# Patient Record
Sex: MALE | Race: WHITE | NOT HISPANIC OR LATINO
[De-identification: names, ages, dates, MRNs, and addresses within clinical notes are randomized per-mention and may not be internally consistent; named-entity substitution may affect disease eponyms.]

---

## 2017-02-07 ENCOUNTER — RX RENEWAL (OUTPATIENT)
Age: 42
End: 2017-02-07

## 2017-04-27 ENCOUNTER — APPOINTMENT (OUTPATIENT)
Dept: PULMONOLOGY | Facility: CLINIC | Age: 42
End: 2017-04-27

## 2017-04-27 VITALS
DIASTOLIC BLOOD PRESSURE: 84 MMHG | HEIGHT: 71 IN | OXYGEN SATURATION: 98 % | TEMPERATURE: 97.2 F | BODY MASS INDEX: 25.06 KG/M2 | SYSTOLIC BLOOD PRESSURE: 120 MMHG | WEIGHT: 179 LBS | HEART RATE: 72 BPM

## 2017-04-27 DIAGNOSIS — Z82.49 FAMILY HISTORY OF ISCHEMIC HEART DISEASE AND OTHER DISEASES OF THE CIRCULATORY SYSTEM: ICD-10-CM

## 2017-04-27 DIAGNOSIS — Z83.3 FAMILY HISTORY OF DIABETES MELLITUS: ICD-10-CM

## 2017-05-01 ENCOUNTER — APPOINTMENT (OUTPATIENT)
Dept: ENDOCRINOLOGY | Facility: CLINIC | Age: 42
End: 2017-05-01

## 2017-05-01 VITALS
HEART RATE: 68 BPM | SYSTOLIC BLOOD PRESSURE: 123 MMHG | WEIGHT: 174 LBS | DIASTOLIC BLOOD PRESSURE: 78 MMHG | BODY MASS INDEX: 24.27 KG/M2

## 2017-05-01 DIAGNOSIS — F41.9 ANXIETY DISORDER, UNSPECIFIED: ICD-10-CM

## 2017-05-03 LAB
THYROGLOB AB SERPL-ACNC: <20 IU/ML
THYROGLOB SERPL-MCNC: <0.2 NG/ML
TSH SERPL-ACNC: 1.01 UIU/ML

## 2017-05-25 ENCOUNTER — MEDICATION RENEWAL (OUTPATIENT)
Age: 42
End: 2017-05-25

## 2017-06-04 ENCOUNTER — LABORATORY RESULT (OUTPATIENT)
Age: 42
End: 2017-06-04

## 2017-06-05 ENCOUNTER — APPOINTMENT (OUTPATIENT)
Dept: HEART AND VASCULAR | Facility: CLINIC | Age: 42
End: 2017-06-05

## 2017-06-05 VITALS
SYSTOLIC BLOOD PRESSURE: 120 MMHG | WEIGHT: 175 LBS | BODY MASS INDEX: 24.5 KG/M2 | TEMPERATURE: 97.9 F | DIASTOLIC BLOOD PRESSURE: 60 MMHG | HEART RATE: 75 BPM | OXYGEN SATURATION: 98 % | HEIGHT: 71 IN

## 2017-06-06 LAB
ALBUMIN SERPL ELPH-MCNC: 5 G/DL
ALP BLD-CCNC: 60 U/L
ALT SERPL-CCNC: 70 U/L
ANION GAP SERPL CALC-SCNC: 21 MMOL/L
AST SERPL-CCNC: 21 U/L
BASOPHILS # BLD AUTO: 0 K/UL
BASOPHILS NFR BLD AUTO: 0 %
BILIRUB SERPL-MCNC: 1 MG/DL
BUN SERPL-MCNC: 22 MG/DL
CALCIUM SERPL-MCNC: 9.7 MG/DL
CHLORIDE SERPL-SCNC: 101 MMOL/L
CHOLEST SERPL-MCNC: 162 MG/DL
CHOLEST/HDLC SERPL: 5.6 RATIO
CO2 SERPL-SCNC: 20 MMOL/L
CREAT SERPL-MCNC: 1.06 MG/DL
EOSINOPHIL # BLD AUTO: 0.13 K/UL
EOSINOPHIL NFR BLD AUTO: 1.8 %
GLUCOSE SERPL-MCNC: 88 MG/DL
HBA1C MFR BLD HPLC: 4.8 %
HCT VFR BLD CALC: 43 %
HDLC SERPL-MCNC: 29 MG/DL
HGB BLD-MCNC: 14.7 G/DL
LDLC SERPL CALC-MCNC: 99 MG/DL
LYMPHOCYTES # BLD AUTO: 1.23 K/UL
LYMPHOCYTES NFR BLD AUTO: 17.5 %
MAN DIFF?: NORMAL
MCHC RBC-ENTMCNC: 30.5 PG
MCHC RBC-ENTMCNC: 34.2 GM/DL
MCV RBC AUTO: 89.2 FL
MONOCYTES # BLD AUTO: 0.31 K/UL
MONOCYTES NFR BLD AUTO: 4.4 %
NEUTROPHILS # BLD AUTO: 5.3 K/UL
NEUTROPHILS NFR BLD AUTO: 75.4 %
PLATELET # BLD AUTO: 178 K/UL
POTASSIUM SERPL-SCNC: 4.5 MMOL/L
PROT SERPL-MCNC: 7.6 G/DL
RBC # BLD: 4.82 M/UL
RBC # FLD: 13.8 %
SODIUM SERPL-SCNC: 142 MMOL/L
TRIGL SERPL-MCNC: 172 MG/DL
TSH SERPL-ACNC: 1.06 UIU/ML
WBC # FLD AUTO: 7.03 K/UL

## 2017-06-07 ENCOUNTER — CLINICAL ADVICE (OUTPATIENT)
Age: 42
End: 2017-06-07

## 2017-06-16 ENCOUNTER — MEDICATION RENEWAL (OUTPATIENT)
Age: 42
End: 2017-06-16

## 2017-07-10 ENCOUNTER — APPOINTMENT (OUTPATIENT)
Dept: HEART AND VASCULAR | Facility: CLINIC | Age: 42
End: 2017-07-10

## 2017-07-31 ENCOUNTER — APPOINTMENT (OUTPATIENT)
Dept: HEART AND VASCULAR | Facility: CLINIC | Age: 42
End: 2017-07-31
Payer: COMMERCIAL

## 2017-07-31 ENCOUNTER — APPOINTMENT (OUTPATIENT)
Dept: HEART AND VASCULAR | Facility: CLINIC | Age: 42
End: 2017-07-31

## 2017-07-31 VITALS
HEART RATE: 74 BPM | DIASTOLIC BLOOD PRESSURE: 70 MMHG | BODY MASS INDEX: 24.5 KG/M2 | SYSTOLIC BLOOD PRESSURE: 110 MMHG | HEIGHT: 71 IN | WEIGHT: 175 LBS

## 2017-07-31 DIAGNOSIS — R06.09 OTHER FORMS OF DYSPNEA: ICD-10-CM

## 2017-07-31 PROCEDURE — 99214 OFFICE O/P EST MOD 30 MIN: CPT | Mod: 25

## 2017-07-31 PROCEDURE — 93015 CV STRESS TEST SUPVJ I&R: CPT

## 2017-08-15 ENCOUNTER — TRANSCRIPTION ENCOUNTER (OUTPATIENT)
Age: 42
End: 2017-08-15

## 2017-08-16 ENCOUNTER — MEDICATION RENEWAL (OUTPATIENT)
Age: 42
End: 2017-08-16

## 2017-11-06 ENCOUNTER — CLINICAL ADVICE (OUTPATIENT)
Age: 42
End: 2017-11-06

## 2017-12-04 ENCOUNTER — APPOINTMENT (OUTPATIENT)
Dept: GASTROENTEROLOGY | Facility: CLINIC | Age: 42
End: 2017-12-04
Payer: COMMERCIAL

## 2017-12-04 VITALS
RESPIRATION RATE: 14 BRPM | SYSTOLIC BLOOD PRESSURE: 125 MMHG | TEMPERATURE: 98.1 F | WEIGHT: 168 LBS | DIASTOLIC BLOOD PRESSURE: 80 MMHG | BODY MASS INDEX: 23.43 KG/M2 | OXYGEN SATURATION: 99 % | HEART RATE: 78 BPM

## 2017-12-04 PROCEDURE — 36415 COLL VENOUS BLD VENIPUNCTURE: CPT

## 2017-12-04 PROCEDURE — 99203 OFFICE O/P NEW LOW 30 MIN: CPT | Mod: 25

## 2017-12-05 LAB
IRON SATN MFR SERPL: 27 %
IRON SERPL-MCNC: 74 UG/DL
TIBC SERPL-MCNC: 273 UG/DL
UIBC SERPL-MCNC: 199 UG/DL

## 2017-12-08 ENCOUNTER — RESULT REVIEW (OUTPATIENT)
Age: 42
End: 2017-12-08

## 2017-12-20 ENCOUNTER — FORM ENCOUNTER (OUTPATIENT)
Age: 42
End: 2017-12-20

## 2017-12-20 DIAGNOSIS — C73 MALIGNANT NEOPLASM OF THYROID GLAND: ICD-10-CM

## 2017-12-21 ENCOUNTER — OUTPATIENT (OUTPATIENT)
Dept: OUTPATIENT SERVICES | Facility: HOSPITAL | Age: 42
LOS: 1 days | End: 2017-12-21
Payer: COMMERCIAL

## 2017-12-21 PROCEDURE — 76705 ECHO EXAM OF ABDOMEN: CPT

## 2017-12-21 PROCEDURE — 76705 ECHO EXAM OF ABDOMEN: CPT | Mod: 26

## 2017-12-24 PROBLEM — C73 PAPILLARY THYROID CARCINOMA: Status: ACTIVE | Noted: 2017-12-24

## 2018-01-05 ENCOUNTER — RESULT REVIEW (OUTPATIENT)
Age: 43
End: 2018-01-05

## 2018-01-24 ENCOUNTER — APPOINTMENT (OUTPATIENT)
Dept: HEMATOLOGY ONCOLOGY | Facility: CLINIC | Age: 43
End: 2018-01-24
Payer: COMMERCIAL

## 2018-01-24 VITALS
WEIGHT: 173 LBS | HEIGHT: 71 IN | HEART RATE: 61 BPM | BODY MASS INDEX: 24.22 KG/M2 | TEMPERATURE: 97.7 F | DIASTOLIC BLOOD PRESSURE: 75 MMHG | RESPIRATION RATE: 14 BRPM | OXYGEN SATURATION: 100 % | SYSTOLIC BLOOD PRESSURE: 126 MMHG

## 2018-01-24 PROCEDURE — 99205 OFFICE O/P NEW HI 60 MIN: CPT | Mod: 25

## 2018-01-24 PROCEDURE — 36415 COLL VENOUS BLD VENIPUNCTURE: CPT

## 2018-01-25 ENCOUNTER — LABORATORY RESULT (OUTPATIENT)
Age: 43
End: 2018-01-25

## 2018-01-25 LAB
25(OH)D3 SERPL-MCNC: 18.5 NG/ML
BASOPHILS # BLD AUTO: 0.03 K/UL
BASOPHILS NFR BLD AUTO: 0.5 %
EOSINOPHIL # BLD AUTO: 0.05 K/UL
EOSINOPHIL NFR BLD AUTO: 0.8 %
HCT VFR BLD CALC: 42.5 %
HGB BLD-MCNC: 15.3 G/DL
IMM GRANULOCYTES NFR BLD AUTO: 1.6 %
LDH SERPL-CCNC: 190 U/L
LYMPHOCYTES # BLD AUTO: 0.96 K/UL
LYMPHOCYTES NFR BLD AUTO: 16.1 %
MAN DIFF?: NO
MCHC RBC-ENTMCNC: 31.1 PG
MCHC RBC-ENTMCNC: 36 G/DL
MCV RBC AUTO: 86.4 FL
MONOCYTES # BLD AUTO: 0.27 K/UL
MONOCYTES NFR BLD AUTO: 3.5 %
NEUTROPHILS # BLD AUTO: 5.6 K/UL
NEUTROPHILS NFR BLD AUTO: 79.1 %
PLATELET # BLD AUTO: 198 K/UL
RBC # BLD: 4.92 M/UL
RBC # FLD: 13.3 %
VIT B12 SERPL-MCNC: 391 PG/ML
WBC # FLD AUTO: 8.25 K/UL

## 2018-02-01 ENCOUNTER — TRANSCRIPTION ENCOUNTER (OUTPATIENT)
Age: 43
End: 2018-02-01

## 2018-02-05 ENCOUNTER — RESULT REVIEW (OUTPATIENT)
Age: 43
End: 2018-02-05

## 2018-02-05 LAB — GAU1 GENE MUT TESTED BLD/T: NEGATIVE

## 2018-02-14 ENCOUNTER — RESULT REVIEW (OUTPATIENT)
Age: 43
End: 2018-02-14

## 2018-02-14 ENCOUNTER — TRANSCRIPTION ENCOUNTER (OUTPATIENT)
Age: 43
End: 2018-02-14

## 2018-02-14 ENCOUNTER — RX RENEWAL (OUTPATIENT)
Age: 43
End: 2018-02-14

## 2018-02-14 RX ORDER — ERGOCALCIFEROL 1.25 MG/1
1.25 MG CAPSULE, LIQUID FILLED ORAL
Qty: 4 | Refills: 5 | Status: ACTIVE | COMMUNITY
Start: 2018-02-14 | End: 1900-01-01

## 2018-03-05 ENCOUNTER — APPOINTMENT (OUTPATIENT)
Dept: GASTROENTEROLOGY | Facility: CLINIC | Age: 43
End: 2018-03-05

## 2018-03-22 ENCOUNTER — APPOINTMENT (OUTPATIENT)
Dept: HEMATOLOGY ONCOLOGY | Facility: CLINIC | Age: 43
End: 2018-03-22
Payer: COMMERCIAL

## 2018-03-22 VITALS
TEMPERATURE: 97.4 F | HEART RATE: 78 BPM | BODY MASS INDEX: 23.94 KG/M2 | SYSTOLIC BLOOD PRESSURE: 129 MMHG | WEIGHT: 171 LBS | OXYGEN SATURATION: 100 % | DIASTOLIC BLOOD PRESSURE: 79 MMHG | RESPIRATION RATE: 14 BRPM | HEIGHT: 71 IN

## 2018-03-22 DIAGNOSIS — R79.89 OTHER SPECIFIED ABNORMAL FINDINGS OF BLOOD CHEMISTRY: ICD-10-CM

## 2018-03-22 DIAGNOSIS — E53.8 DEFICIENCY OF OTHER SPECIFIED B GROUP VITAMINS: ICD-10-CM

## 2018-03-22 PROCEDURE — 36415 COLL VENOUS BLD VENIPUNCTURE: CPT

## 2018-03-22 PROCEDURE — 96372 THER/PROPH/DIAG INJ SC/IM: CPT

## 2018-03-22 PROCEDURE — 99214 OFFICE O/P EST MOD 30 MIN: CPT | Mod: 25

## 2018-03-22 RX ORDER — CYANOCOBALAMIN 1000 UG/ML
1000 INJECTION INTRAMUSCULAR; SUBCUTANEOUS
Qty: 0 | Refills: 0 | Status: COMPLETED | OUTPATIENT
Start: 2018-03-22

## 2018-03-22 RX ADMIN — CYANOCOBALAMIN 0 MCG/ML: 1000 INJECTION INTRAMUSCULAR; SUBCUTANEOUS at 00:00

## 2018-03-23 LAB
25(OH)D3 SERPL-MCNC: 30.6 NG/ML
ALBUMIN SERPL ELPH-MCNC: 4.9 G/DL
ALP BLD-CCNC: 52 U/L
ALT SERPL-CCNC: 31 U/L
ANION GAP SERPL CALC-SCNC: 14 MMOL/L
AST SERPL-CCNC: 18 U/L
BASOPHILS # BLD AUTO: 0.03 K/UL
BASOPHILS NFR BLD AUTO: 0.4 %
BILIRUB SERPL-MCNC: 0.8 MG/DL
BUN SERPL-MCNC: 18 MG/DL
CALCIUM SERPL-MCNC: 9.6 MG/DL
CHLORIDE SERPL-SCNC: 102 MMOL/L
CHOLEST SERPL-MCNC: 150 MG/DL
CHOLEST/HDLC SERPL: 4.8 RATIO
CO2 SERPL-SCNC: 23 MMOL/L
CREAT SERPL-MCNC: 0.96 MG/DL
EOSINOPHIL # BLD AUTO: 0.08 K/UL
EOSINOPHIL NFR BLD AUTO: 1.1 %
ERYTHROCYTE [SEDIMENTATION RATE] IN BLOOD BY WESTERGREN METHOD: 2 MM/HR
GLUCOSE SERPL-MCNC: 104 MG/DL
HCT VFR BLD CALC: 43.5 %
HDLC SERPL-MCNC: 31 MG/DL
HGB BLD-MCNC: 15 G/DL
IMM GRANULOCYTES NFR BLD AUTO: 0.7 %
LDH SERPL-CCNC: 155 U/L
LDLC SERPL CALC-MCNC: 80 MG/DL
LYMPHOCYTES # BLD AUTO: 1.23 K/UL
LYMPHOCYTES NFR BLD AUTO: 16.4 %
MAN DIFF?: NORMAL
MCHC RBC-ENTMCNC: 30.8 PG
MCHC RBC-ENTMCNC: 34.5 GM/DL
MCV RBC AUTO: 89.3 FL
MONOCYTES # BLD AUTO: 0.32 K/UL
MONOCYTES NFR BLD AUTO: 4.3 %
NEUTROPHILS # BLD AUTO: 5.81 K/UL
NEUTROPHILS NFR BLD AUTO: 77.1 %
PLATELET # BLD AUTO: 190 K/UL
POTASSIUM SERPL-SCNC: 4 MMOL/L
PROT SERPL-MCNC: 7.7 G/DL
RBC # BLD: 4.87 M/UL
RBC # FLD: 13.2 %
SODIUM SERPL-SCNC: 139 MMOL/L
TRIGL SERPL-MCNC: 195 MG/DL
TSH SERPL-ACNC: 3.08 UIU/ML
VIT B12 SERPL-MCNC: 1057 PG/ML
WBC # FLD AUTO: 7.52 K/UL

## 2018-04-23 ENCOUNTER — APPOINTMENT (OUTPATIENT)
Dept: HEMATOLOGY ONCOLOGY | Facility: CLINIC | Age: 43
End: 2018-04-23
Payer: COMMERCIAL

## 2018-04-23 VITALS
WEIGHT: 169 LBS | HEART RATE: 80 BPM | RESPIRATION RATE: 14 BRPM | BODY MASS INDEX: 23.66 KG/M2 | DIASTOLIC BLOOD PRESSURE: 81 MMHG | SYSTOLIC BLOOD PRESSURE: 131 MMHG | OXYGEN SATURATION: 99 % | TEMPERATURE: 98.1 F | HEIGHT: 71 IN

## 2018-04-23 DIAGNOSIS — R16.1 SPLENOMEGALY, NOT ELSEWHERE CLASSIFIED: ICD-10-CM

## 2018-04-23 PROCEDURE — 99214 OFFICE O/P EST MOD 30 MIN: CPT

## 2018-07-16 ENCOUNTER — RX RENEWAL (OUTPATIENT)
Age: 43
End: 2018-07-16

## 2018-07-18 ENCOUNTER — MEDICATION RENEWAL (OUTPATIENT)
Age: 43
End: 2018-07-18

## 2019-01-08 ENCOUNTER — RX RENEWAL (OUTPATIENT)
Age: 44
End: 2019-01-08

## 2019-02-27 ENCOUNTER — APPOINTMENT (OUTPATIENT)
Dept: ENDOCRINOLOGY | Facility: CLINIC | Age: 44
End: 2019-02-27
Payer: MEDICAID

## 2019-02-27 VITALS
SYSTOLIC BLOOD PRESSURE: 138 MMHG | DIASTOLIC BLOOD PRESSURE: 72 MMHG | HEART RATE: 71 BPM | BODY MASS INDEX: 24.13 KG/M2 | WEIGHT: 173 LBS

## 2019-02-27 DIAGNOSIS — E55.9 VITAMIN D DEFICIENCY, UNSPECIFIED: ICD-10-CM

## 2019-02-27 PROCEDURE — 99213 OFFICE O/P EST LOW 20 MIN: CPT | Mod: 25

## 2019-02-27 PROCEDURE — 36415 COLL VENOUS BLD VENIPUNCTURE: CPT

## 2019-02-27 NOTE — HISTORY OF PRESENT ILLNESS
[Date: ______] : [unfilled] [FULLER] : patient was treated with radioactive iodine [FreeTextEntry1] : saw cardiology for eval of getting lightheaded after walking up stairs or going from sitting to getting up\par he does not get lightheaded when playing tennis\par did stress test, was ok. \par found to have elevated ALT (70) but further workup was negative\par feeling ok now.  exercising less than before, not playing tennis recently\par weight stable\par sleeping well, still has some anxiety\par no constipation\par no palpitations\par was found to have low vitamin D, took 50K weekly for 2-3 months and then stopped\par \par meds:\par Unithroid 200mcg, 6.5 tab per week\par Ativan prn, takes 1-2x/month [de-identified] : total thyroidectomy [de-identified] : papillary, 4cm, left side [de-identified] : 4/2002 [de-identified] : 29 mCi [de-identified] : 5/17: Tg < 0.20, Tg Ab < 20\par 6/15: TSH 12, Tg < 0.20, Tg Ab < 20\par 9/14: TSH 9.7, Tg < 0.20, Tg Ab < 20

## 2019-02-27 NOTE — PHYSICAL EXAM
[Alert] : alert [Healthy Appearance] : healthy appearance [Normal Voice/Communication] : normal voice communication [No Proptosis] : no proptosis [No Lid Lag] : no lid lag [Normal Hearing] : hearing was normal [No LAD] : no lymphadenopathy [Clear to Auscultation] : lungs were clear to auscultation bilaterally [Normal S1, S2] : normal S1 and S2 [Regular Rhythm] : with a regular rhythm [No Stigmata of Cushings Syndrome] : no stigmata of cushings syndrome [de-identified] : thyroid not palpable

## 2019-02-27 NOTE — ASSESSMENT
[FreeTextEntry1] : Hypothyroid, surgical.  h/o thyroid cancer, but likely cured (no evidence of disease).  does not need TSH suppression, goal TSH 0.5-4.0 range, will adjust Unithroid dose, if necessary.\par will send for neck sono periodically (every 5 years) unless Tg is increasing.\par RTO 1 year

## 2019-02-27 NOTE — DATA REVIEWED
[FreeTextEntry1] : 3/18: TSH 3.08, tot chol 150, trgi 195, HDL 31, LDL 80, B12 1057, 25D 30.6\par 1/18: 25D 18.5\par 6/17: TSH 1.06, tot chol 162, trig 172, HDL 29, LDL 99\par 4/16: TSH 0.33, free T4 2.2

## 2019-03-01 LAB
25(OH)D3 SERPL-MCNC: 17.9 NG/ML
THYROGLOB AB SERPL-ACNC: <20 IU/ML
THYROGLOB SERPL-MCNC: <0.2 NG/ML
TSH SERPL-ACNC: 22.8 UIU/ML

## 2019-03-05 ENCOUNTER — TRANSCRIPTION ENCOUNTER (OUTPATIENT)
Age: 44
End: 2019-03-05

## 2019-03-21 ENCOUNTER — APPOINTMENT (OUTPATIENT)
Dept: HEART AND VASCULAR | Facility: CLINIC | Age: 44
End: 2019-03-21

## 2019-03-22 ENCOUNTER — APPOINTMENT (OUTPATIENT)
Dept: HEART AND VASCULAR | Facility: CLINIC | Age: 44
End: 2019-03-22
Payer: MEDICAID

## 2019-03-22 VITALS
HEART RATE: 65 BPM | HEIGHT: 71 IN | BODY MASS INDEX: 24.22 KG/M2 | RESPIRATION RATE: 14 BRPM | WEIGHT: 173 LBS | SYSTOLIC BLOOD PRESSURE: 118 MMHG | OXYGEN SATURATION: 99 % | DIASTOLIC BLOOD PRESSURE: 75 MMHG

## 2019-03-22 DIAGNOSIS — R00.2 PALPITATIONS: ICD-10-CM

## 2019-03-22 PROCEDURE — 36415 COLL VENOUS BLD VENIPUNCTURE: CPT

## 2019-03-22 PROCEDURE — 93000 ELECTROCARDIOGRAM COMPLETE: CPT

## 2019-03-22 PROCEDURE — 99396 PREV VISIT EST AGE 40-64: CPT

## 2019-03-22 RX ORDER — LORAZEPAM 1 MG/1
1 TABLET ORAL
Refills: 0 | Status: DISCONTINUED | COMMUNITY
End: 2019-03-22

## 2019-03-22 NOTE — DISCUSSION/SUMMARY
[FreeTextEntry1] : discussed rfm and vaccines and cancer screening\par referred ortho/hand\par event recorder\par seeing endo in f/u re tsh

## 2019-03-22 NOTE — HISTORY OF PRESENT ILLNESS
[FreeTextEntry1] : here for annualfollows w endo and gi and heme\par still gets pallps, or racing about once a month\par associated with lightheadedness but no syncope\par also associated with dyspnea\par no chest tightness\par also comp of right pinkie finger pain that radiates up the arm and locking of the joint

## 2019-03-22 NOTE — PHYSICAL EXAM
[General Appearance - Well Developed] : well developed [Normal Appearance] : normal appearance [Well Groomed] : well groomed [General Appearance - Well Nourished] : well nourished [No Deformities] : no deformities [General Appearance - In No Acute Distress] : no acute distress [Normal Conjunctiva] : the conjunctiva exhibited no abnormalities [Eyelids - No Xanthelasma] : the eyelids demonstrated no xanthelasmas [Normal Oral Mucosa] : normal oral mucosa [No Oral Pallor] : no oral pallor [No Oral Cyanosis] : no oral cyanosis [Normal Jugular Venous A Waves Present] : normal jugular venous A waves present [Normal Jugular Venous V Waves Present] : normal jugular venous V waves present [No Jugular Venous [place] A Waves] : no jugular venous new[place] A waves [Respiration, Rhythm And Depth] : normal respiratory rhythm and effort [Exaggerated Use Of Accessory Muscles For Inspiration] : no accessory muscle use [Auscultation Breath Sounds / Voice Sounds] : lungs were clear to auscultation bilaterally [Heart Rate And Rhythm] : heart rate and rhythm were normal [Heart Sounds] : normal S1 and S2 [Murmurs] : no murmurs present [Abdomen Soft] : soft [Abdomen Tenderness] : non-tender [Abdomen Mass (___ Cm)] : no abdominal mass palpated [Abnormal Walk] : normal gait [Gait - Sufficient For Exercise Testing] : the gait was sufficient for exercise testing [Nail Clubbing] : no clubbing of the fingernails [Cyanosis, Localized] : no localized cyanosis [Petechial Hemorrhages (___cm)] : no petechial hemorrhages [Skin Color & Pigmentation] : normal skin color and pigmentation [] : no rash [No Venous Stasis] : no venous stasis [Skin Lesions] : no skin lesions [No Skin Ulcers] : no skin ulcer [No Xanthoma] : no  xanthoma was observed [Oriented To Time, Place, And Person] : oriented to person, place, and time [Affect] : the affect was normal [Mood] : the mood was normal [No Anxiety] : not feeling anxious

## 2019-03-24 LAB
ALBUMIN SERPL ELPH-MCNC: 4.9 G/DL
ALP BLD-CCNC: 61 U/L
ALT SERPL-CCNC: 66 U/L
ANION GAP SERPL CALC-SCNC: 11 MMOL/L
AST SERPL-CCNC: 17 U/L
BASOPHILS # BLD AUTO: 0.03 K/UL
BASOPHILS NFR BLD AUTO: 0.5 %
BILIRUB SERPL-MCNC: 0.8 MG/DL
BUN SERPL-MCNC: 19 MG/DL
CALCIUM SERPL-MCNC: 9.5 MG/DL
CHLORIDE SERPL-SCNC: 104 MMOL/L
CHOLEST SERPL-MCNC: 131 MG/DL
CHOLEST/HDLC SERPL: 4.4 RATIO
CO2 SERPL-SCNC: 26 MMOL/L
CREAT SERPL-MCNC: 0.91 MG/DL
EOSINOPHIL # BLD AUTO: 0.07 K/UL
EOSINOPHIL NFR BLD AUTO: 1.1 %
ESTIMATED AVERAGE GLUCOSE: 88 MG/DL
GLUCOSE SERPL-MCNC: 93 MG/DL
HBA1C MFR BLD HPLC: 4.7 %
HCT VFR BLD CALC: 42.5 %
HDLC SERPL-MCNC: 30 MG/DL
HGB BLD-MCNC: 14.2 G/DL
IMM GRANULOCYTES NFR BLD AUTO: 1.2 %
LDLC SERPL CALC-MCNC: 86 MG/DL
LYMPHOCYTES # BLD AUTO: 0.97 K/UL
LYMPHOCYTES NFR BLD AUTO: 14.9 %
MAN DIFF?: NORMAL
MCHC RBC-ENTMCNC: 30.3 PG
MCHC RBC-ENTMCNC: 33.4 GM/DL
MCV RBC AUTO: 90.6 FL
MONOCYTES # BLD AUTO: 0.38 K/UL
MONOCYTES NFR BLD AUTO: 5.9 %
NEUTROPHILS # BLD AUTO: 4.96 K/UL
NEUTROPHILS NFR BLD AUTO: 76.4 %
PLATELET # BLD AUTO: 174 K/UL
POTASSIUM SERPL-SCNC: 4.6 MMOL/L
PROT SERPL-MCNC: 7.2 G/DL
RBC # BLD: 4.69 M/UL
RBC # FLD: 13.3 %
SODIUM SERPL-SCNC: 141 MMOL/L
TRIGL SERPL-MCNC: 76 MG/DL
TSH SERPL-ACNC: 3.62 UIU/ML
WBC # FLD AUTO: 6.49 K/UL

## 2019-04-03 ENCOUNTER — APPOINTMENT (OUTPATIENT)
Dept: DERMATOLOGY | Facility: CLINIC | Age: 44
End: 2019-04-03
Payer: MEDICAID

## 2019-04-03 VITALS — DIASTOLIC BLOOD PRESSURE: 80 MMHG | SYSTOLIC BLOOD PRESSURE: 122 MMHG

## 2019-04-03 DIAGNOSIS — H01.9 UNSPECIFIED INFLAMMATION OF EYELID: ICD-10-CM

## 2019-04-03 DIAGNOSIS — L91.8 OTHER HYPERTROPHIC DISORDERS OF THE SKIN: ICD-10-CM

## 2019-04-03 DIAGNOSIS — Z12.83 ENCOUNTER FOR SCREENING FOR MALIGNANT NEOPLASM OF SKIN: ICD-10-CM

## 2019-04-03 DIAGNOSIS — L30.9 DERMATITIS, UNSPECIFIED: ICD-10-CM

## 2019-04-03 DIAGNOSIS — Z91.89 OTHER SPECIFIED PERSONAL RISK FACTORS, NOT ELSEWHERE CLASSIFIED: ICD-10-CM

## 2019-04-03 PROCEDURE — 99203 OFFICE O/P NEW LOW 30 MIN: CPT

## 2019-04-03 PROCEDURE — D0051: CPT

## 2019-04-03 RX ORDER — TACROLIMUS 1 MG/G
0.1 OINTMENT TOPICAL
Qty: 1 | Refills: 0 | Status: ACTIVE | COMMUNITY
Start: 2019-04-03 | End: 1900-01-01

## 2019-04-03 NOTE — PHYSICAL EXAM
[Alert] : alert [Oriented x 3] : ~L oriented x 3 [Well Nourished] : well nourished [Conjunctiva Non-injected] : conjunctiva non-injected [No Visual Lymphadenopathy] : no visual  lymphadenopathy [No Clubbing] : no clubbing [No Edema] : no edema [No Bromhidrosis] : no bromhidrosis [No Chromhidrosis] : no chromhidrosis [Full Body Skin Exam Performed] : performed [FreeTextEntry3] : small fleshy pedunculated papules bilateral cheeks and upper eyelids\par scaly pink plaques L upper eyelid and perineum\par

## 2019-04-03 NOTE — HISTORY OF PRESENT ILLNESS
[FreeTextEntry1] : moles [de-identified] : 42 yo M referred by Dr. Lee for the above. No hx skin cancer. Is good about sun protection since treated for thyroid cancer in 2002. Has flaky itchy spots on L upper eyelid and beneath scrotum. Not using anything. Has skin tags on cheeks.

## 2019-04-03 NOTE — CONSULT LETTER
[Dear  ___] : Dear  [unfilled], [Consult Letter:] : I had the pleasure of evaluating your patient, [unfilled]. [Please see my note below.] : Please see my note below. [Consult Closing:] : Thank you very much for allowing me to participate in the care of this patient.  If you have any questions, please do not hesitate to contact me. [Sincerely,] : Sincerely, [FreeTextEntry3] : Ashley Francisco MD\par [facility]\par

## 2019-04-10 ENCOUNTER — RX RENEWAL (OUTPATIENT)
Age: 44
End: 2019-04-10

## 2019-05-09 ENCOUNTER — MEDICATION RENEWAL (OUTPATIENT)
Age: 44
End: 2019-05-09

## 2019-11-26 ENCOUNTER — MEDICATION RENEWAL (OUTPATIENT)
Age: 44
End: 2019-11-26

## 2019-11-26 ENCOUNTER — APPOINTMENT (OUTPATIENT)
Dept: DERMATOLOGY | Facility: CLINIC | Age: 44
End: 2019-11-26

## 2020-01-08 ENCOUNTER — MEDICATION RENEWAL (OUTPATIENT)
Age: 45
End: 2020-01-08

## 2020-02-27 ENCOUNTER — APPOINTMENT (OUTPATIENT)
Dept: ENDOCRINOLOGY | Facility: CLINIC | Age: 45
End: 2020-02-27

## 2020-04-17 ENCOUNTER — TRANSCRIPTION ENCOUNTER (OUTPATIENT)
Age: 45
End: 2020-04-17

## 2020-04-23 ENCOUNTER — TRANSCRIPTION ENCOUNTER (OUTPATIENT)
Age: 45
End: 2020-04-23

## 2020-04-24 ENCOUNTER — TRANSCRIPTION ENCOUNTER (OUTPATIENT)
Age: 45
End: 2020-04-24

## 2020-04-29 ENCOUNTER — RX RENEWAL (OUTPATIENT)
Age: 45
End: 2020-04-29

## 2020-06-23 ENCOUNTER — APPOINTMENT (OUTPATIENT)
Dept: ENDOCRINOLOGY | Facility: CLINIC | Age: 45
End: 2020-06-23
Payer: COMMERCIAL

## 2020-06-23 VITALS
SYSTOLIC BLOOD PRESSURE: 128 MMHG | BODY MASS INDEX: 24.22 KG/M2 | HEART RATE: 69 BPM | WEIGHT: 173 LBS | DIASTOLIC BLOOD PRESSURE: 75 MMHG | HEIGHT: 71 IN

## 2020-06-23 DIAGNOSIS — Z11.59 ENCOUNTER FOR SCREENING FOR OTHER VIRAL DISEASES: ICD-10-CM

## 2020-06-23 PROCEDURE — 99213 OFFICE O/P EST LOW 20 MIN: CPT

## 2020-06-23 NOTE — HISTORY OF PRESENT ILLNESS
[FULLER] : patient was treated with radioactive iodine [Thyrogen-stimulated] : thyrogen-stimulated [FreeTextEntry1] : no health issues since last visit\par weight is stable.  no palpitations or feeling jittery.  no increased anxiety.\par sleeping well.  BMs regular\par bought a house in [facility] ([place], [place]) and has been active doing housework\par was sick in December with cough and SOB\par \par meds:\par Unithroid 200mcg daily\par Ativan prn, takes 1-2x/month\par has not been taking vitamin D recently [de-identified] : 2002 [de-identified] : papillary, 4cm, Left side [de-identified] : total thyroidectomy [de-identified] : 4/2002 [de-identified] : 89 [de-identified] : \par undetectable Tg and Tg Ab since 2014

## 2020-06-23 NOTE — PHYSICAL EXAM
[Alert] : alert [Healthy Appearance] : healthy appearance [No Lid Lag] : no lid lag [No Proptosis] : no proptosis [No LAD] : no lymphadenopathy [Normal Hearing] : hearing was normal [Clear to Auscultation] : lungs were clear to auscultation bilaterally [Normal S1, S2] : normal S1 and S2 [Regular Rhythm] : with a regular rhythm [Normal Mood] : the mood was normal [Normal Affect] : the affect was normal [Acanthosis Nigricans] : no acanthosis nigricans [de-identified] : thyroid not palpable

## 2020-06-23 NOTE — ASSESSMENT
[FreeTextEntry1] : Hypothyroidism, surgical.  history of thyroid cancer, likely cured.\par Check TSH today. Goal 0.5-4.0 range. does not need TSH suppression. \par \par Vitamin D and B12 deficiencies.  will check 25D and B12 levels.\par check Covid antibody, history of viral illness in December.\par RTO 1 year\par

## 2020-06-23 NOTE — DATA REVIEWED
[FreeTextEntry1] : 3/19: TSH 2.63, tot chol 131, trig 76, HDL 30, LDL 86, A1c 4.7%\par 2/19: TSH 22.80, Tg < 0.20 ,Tg Ab < 20, 25D 17.9\par 3/18: TSH 3.08, tot chol 150, trgi 195, HDL 31, LDL 80, B12 1057, 25D 30.6\par 1/18: 25D 18.5\par 6/17: TSH 1.06, tot chol 162, trig 172, HDL 29, LDL 99\par 4/16: TSH 0.33, free T4 2.2

## 2020-06-23 NOTE — REASON FOR VISIT
[Follow - Up] : a follow-up visit [Thyroid Cancer] : thyroid cancer [Hypothyroidism] : hypothyroidism

## 2020-06-25 LAB
25(OH)D3 SERPL-MCNC: 25.8 NG/ML
SARS-COV-2 IGG SERPL IA-ACNC: 0.01 INDEX
SARS-COV-2 IGG SERPL QL IA: NEGATIVE
THYROGLOB AB SERPL-ACNC: <20 IU/ML
THYROGLOB SERPL-MCNC: <0.2 NG/ML
TSH SERPL-ACNC: 0.45 UIU/ML
VIT B12 SERPL-MCNC: 485 PG/ML

## 2020-08-03 ENCOUNTER — TRANSCRIPTION ENCOUNTER (OUTPATIENT)
Age: 45
End: 2020-08-03

## 2020-08-19 ENCOUNTER — RX RENEWAL (OUTPATIENT)
Age: 45
End: 2020-08-19

## 2021-06-24 ENCOUNTER — TRANSCRIPTION ENCOUNTER (OUTPATIENT)
Age: 46
End: 2021-06-24

## 2021-07-15 ENCOUNTER — APPOINTMENT (OUTPATIENT)
Dept: ENDOCRINOLOGY | Facility: CLINIC | Age: 46
End: 2021-07-15
Payer: COMMERCIAL

## 2021-07-15 VITALS
SYSTOLIC BLOOD PRESSURE: 118 MMHG | BODY MASS INDEX: 24.41 KG/M2 | DIASTOLIC BLOOD PRESSURE: 75 MMHG | HEART RATE: 61 BPM | WEIGHT: 175 LBS

## 2021-07-15 PROCEDURE — 99213 OFFICE O/P EST LOW 20 MIN: CPT

## 2021-07-15 PROCEDURE — 99072 ADDL SUPL MATRL&STAF TM PHE: CPT

## 2021-07-15 NOTE — PHYSICAL EXAM
[Alert] : alert [Healthy Appearance] : healthy appearance [No Proptosis] : no proptosis [No Lid Lag] : no lid lag [Normal Hearing] : hearing was normal [No LAD] : no lymphadenopathy [Clear to Auscultation] : lungs were clear to auscultation bilaterally [Normal S1, S2] : normal S1 and S2 [Regular Rhythm] : with a regular rhythm [Normal Affect] : the affect was normal [Normal Mood] : the mood was normal [Acanthosis Nigricans] : no acanthosis nigricans [de-identified] : thyroid not palpable

## 2021-07-15 NOTE — DATA REVIEWED
[FreeTextEntry1] : 6/20:TSH 0.45, Tg < 0.20, Tg Ab < 20, 25D 25.8, B12 485\par 3/19: TSH 2.63, tot chol 131, trig 76, HDL 30, LDL 86, A1c 4.7%\par 2/19: TSH 22.80, Tg < 0.20 ,Tg Ab < 20, 25D 17.9\par 3/18: TSH 3.08, tot chol 150, trgi 195, HDL 31, LDL 80, B12 1057, 25D 30.6\par 1/18: 25D 18.5\par 6/17: TSH 1.06, tot chol 162, trig 172, HDL 29, LDL 99\par 4/16: TSH 0.33, free T4 2.2

## 2021-07-15 NOTE — HISTORY OF PRESENT ILLNESS
[FULLER] : patient was treated with radioactive iodine [Thyrogen-stimulated] : thyrogen-stimulated [FreeTextEntry1] : no health issues since last visit except for more anxiety\par weight is stable.  no palpitations or feeling jittery. \par sleeping well.  no fatigue.\par vaccinated for Covid\par admits that skipping one day/month of Unithroid can be hard\par sister recently found to have thyroid cyst/nodule and FNA returned "atypical"\par \par \par meds:\par Unithroid 200mcg daily, skipping one day per month\par Ativan prn, takes 1-2x/month\par has not been taking vitamin D recently [de-identified] : 2002 [de-identified] : total thyroidectomy [de-identified] : papillary, 4cm, Left side [de-identified] : 4/2002 [de-identified] : 59 [de-identified] : \par undetectable Tg and Tg Ab since 2014

## 2021-07-15 NOTE — ASSESSMENT
[FreeTextEntry1] : Hypothyroidism, surgical.  Remote history of thyroid cancer, likely cured.\par Check TSH today. Goal 0.5-4.0 range. does not need TSH suppression. \par Also will check annual yearly labs.  He also requests vitamin levels and PSA (father has prostate Ca).\par RTO 1 year\par

## 2021-07-16 LAB
25(OH)D3 SERPL-MCNC: 25.8 NG/ML
ALBUMIN SERPL ELPH-MCNC: 5 G/DL
ALP BLD-CCNC: 78 U/L
ALT SERPL-CCNC: 43 U/L
ANION GAP SERPL CALC-SCNC: 16 MMOL/L
AST SERPL-CCNC: 19 U/L
BASOPHILS # BLD AUTO: 0.06 K/UL
BASOPHILS NFR BLD AUTO: 0.8 %
BILIRUB SERPL-MCNC: 1 MG/DL
BUN SERPL-MCNC: 20 MG/DL
CALCIUM SERPL-MCNC: 9.7 MG/DL
CHLORIDE SERPL-SCNC: 105 MMOL/L
CHOLEST SERPL-MCNC: 143 MG/DL
CO2 SERPL-SCNC: 21 MMOL/L
CREAT SERPL-MCNC: 1.11 MG/DL
EOSINOPHIL # BLD AUTO: 0.09 K/UL
EOSINOPHIL NFR BLD AUTO: 1.3 %
GLUCOSE SERPL-MCNC: 108 MG/DL
HCT VFR BLD CALC: 43.3 %
HDLC SERPL-MCNC: 32 MG/DL
HGB BLD-MCNC: 15.1 G/DL
IMM GRANULOCYTES NFR BLD AUTO: 1 %
LDLC SERPL CALC-MCNC: 91 MG/DL
LYMPHOCYTES # BLD AUTO: 1.17 K/UL
LYMPHOCYTES NFR BLD AUTO: 16.3 %
MAN DIFF?: NORMAL
MCHC RBC-ENTMCNC: 30.6 PG
MCHC RBC-ENTMCNC: 34.9 GM/DL
MCV RBC AUTO: 87.8 FL
MONOCYTES # BLD AUTO: 0.31 K/UL
MONOCYTES NFR BLD AUTO: 4.3 %
NEUTROPHILS # BLD AUTO: 5.47 K/UL
NEUTROPHILS NFR BLD AUTO: 76.3 %
NONHDLC SERPL-MCNC: 111 MG/DL
PLATELET # BLD AUTO: 187 K/UL
POTASSIUM SERPL-SCNC: 4.5 MMOL/L
PROT SERPL-MCNC: 7.3 G/DL
PSA SERPL-MCNC: 0.35 NG/ML
RBC # BLD: 4.93 M/UL
RBC # FLD: 13.3 %
SODIUM SERPL-SCNC: 143 MMOL/L
THYROGLOB AB SERPL-ACNC: <20 IU/ML
THYROGLOB SERPL-MCNC: <0.2 NG/ML
TRIGL SERPL-MCNC: 101 MG/DL
TSH SERPL-ACNC: 0.3 UIU/ML
VIT B12 SERPL-MCNC: 422 PG/ML
WBC # FLD AUTO: 7.17 K/UL

## 2021-07-23 ENCOUNTER — APPOINTMENT (OUTPATIENT)
Dept: HEART AND VASCULAR | Facility: CLINIC | Age: 46
End: 2021-07-23
Payer: COMMERCIAL

## 2021-07-23 VITALS
OXYGEN SATURATION: 98 % | WEIGHT: 180 LBS | DIASTOLIC BLOOD PRESSURE: 84 MMHG | RESPIRATION RATE: 14 BRPM | HEIGHT: 71 IN | TEMPERATURE: 98.6 F | BODY MASS INDEX: 25.2 KG/M2 | SYSTOLIC BLOOD PRESSURE: 120 MMHG | HEART RATE: 74 BPM

## 2021-07-23 DIAGNOSIS — Z00.00 ENCOUNTER FOR GENERAL ADULT MEDICAL EXAMINATION W/OUT ABNORMAL FINDINGS: ICD-10-CM

## 2021-07-23 DIAGNOSIS — E89.0 POSTPROCEDURAL HYPOTHYROIDISM: ICD-10-CM

## 2021-07-23 PROCEDURE — 99396 PREV VISIT EST AGE 40-64: CPT

## 2021-07-23 PROCEDURE — 93000 ELECTROCARDIOGRAM COMPLETE: CPT

## 2021-07-23 PROCEDURE — 99072 ADDL SUPL MATRL&STAF TM PHE: CPT

## 2021-07-23 NOTE — PHYSICAL EXAM
[Well Developed] : well developed [Well Nourished] : well nourished [No Acute Distress] : no acute distress [Normal Conjunctiva] : normal conjunctiva [Normal Venous Pressure] : normal venous pressure [No Carotid Bruit] : no carotid bruit [No Murmur] : no murmur [Normal S1, S2] : normal S1, S2 [No Rub] : no rub [No Gallop] : no gallop [Clear Lung Fields] : clear lung fields [Good Air Entry] : good air entry [No Respiratory Distress] : no respiratory distress  [Soft] : abdomen soft [Non Tender] : non-tender [No Masses/organomegaly] : no masses/organomegaly [Normal Bowel Sounds] : normal bowel sounds [Normal Gait] : normal gait [No Edema] : no edema [No Cyanosis] : no cyanosis [No Clubbing] : no clubbing [No Varicosities] : no varicosities [No Rash] : no rash [No Skin Lesions] : no skin lesions [Moves all extremities] : moves all extremities [Normal Speech] : normal speech [No Focal Deficits] : no focal deficits [Alert and Oriented] : alert and oriented [Normal memory] : normal memory

## 2021-08-20 ENCOUNTER — TRANSCRIPTION ENCOUNTER (OUTPATIENT)
Age: 46
End: 2021-08-20

## 2021-10-21 ENCOUNTER — TRANSCRIPTION ENCOUNTER (OUTPATIENT)
Age: 46
End: 2021-10-21

## 2021-10-25 ENCOUNTER — TRANSCRIPTION ENCOUNTER (OUTPATIENT)
Age: 46
End: 2021-10-25

## 2021-11-18 ENCOUNTER — APPOINTMENT (OUTPATIENT)
Dept: UROLOGY | Facility: CLINIC | Age: 46
End: 2021-11-18
Payer: COMMERCIAL

## 2021-11-18 VITALS — TEMPERATURE: 98.7 F | DIASTOLIC BLOOD PRESSURE: 64 MMHG | HEART RATE: 101 BPM | SYSTOLIC BLOOD PRESSURE: 124 MMHG

## 2021-11-18 DIAGNOSIS — N20.1 CALCULUS OF URETER: ICD-10-CM

## 2021-11-18 PROCEDURE — 99204 OFFICE O/P NEW MOD 45 MIN: CPT

## 2021-11-18 NOTE — HISTORY OF PRESENT ILLNESS
[FreeTextEntry1] : Mr Rodriguez is here due to history of right flank since 10/2021\par ER visit 10/19/2021 due to renal colic, CT SCAN showed  2 mm bladder stone . the stone passed out the same day\par the patient still having intermittent right flank pain \par blood\par No urinary symptoms \par history of thyroidectomy 2001 (thyroid cancer) on thyroid replacement\par father with history of prostate cancer\par PSA 0.35 7/2021

## 2021-11-18 NOTE — ASSESSMENT
[FreeTextEntry1] : right flank pain\par passed stone\par persistent pain but better\par stone for chemistry\par renal ultrasound\par increase hydration\par \par father with history prostate cancer\par annual ONIEL and PSA\par

## 2021-11-19 ENCOUNTER — LABORATORY RESULT (OUTPATIENT)
Age: 46
End: 2021-11-19

## 2021-11-20 ENCOUNTER — LABORATORY RESULT (OUTPATIENT)
Age: 46
End: 2021-11-20

## 2021-11-22 LAB
ANION GAP SERPL CALC-SCNC: 12 MMOL/L
APPEARANCE: ABNORMAL
BACTERIA UR CULT: NORMAL
BACTERIA: NEGATIVE
BILIRUBIN URINE: NEGATIVE
BLOOD URINE: NEGATIVE
BUN SERPL-MCNC: 19 MG/DL
CALCIUM SERPL-MCNC: 9.8 MG/DL
CHLORIDE SERPL-SCNC: 102 MMOL/L
CO2 SERPL-SCNC: 26 MMOL/L
COLOR: YELLOW
CREAT SERPL-MCNC: 1.06 MG/DL
GLUCOSE QUALITATIVE U: NEGATIVE
GLUCOSE SERPL-MCNC: 99 MG/DL
HYALINE CASTS: 0 /LPF
KETONES URINE: NEGATIVE
LEUKOCYTE ESTERASE URINE: NEGATIVE
MICROSCOPIC-UA: NORMAL
NITRITE URINE: NEGATIVE
PH URINE: 5.5
POTASSIUM SERPL-SCNC: 4.5 MMOL/L
PROTEIN URINE: NORMAL
RED BLOOD CELLS URINE: 1 /HPF
SODIUM SERPL-SCNC: 140 MMOL/L
SPECIFIC GRAVITY URINE: 1.03
SQUAMOUS EPITHELIAL CELLS: 0 /HPF
UROBILINOGEN URINE: NORMAL
WHITE BLOOD CELLS URINE: 0 /HPF

## 2021-12-03 ENCOUNTER — NON-APPOINTMENT (OUTPATIENT)
Age: 46
End: 2021-12-03

## 2021-12-06 ENCOUNTER — NON-APPOINTMENT (OUTPATIENT)
Age: 46
End: 2021-12-06

## 2021-12-14 ENCOUNTER — RX RENEWAL (OUTPATIENT)
Age: 46
End: 2021-12-14

## 2022-01-21 ENCOUNTER — APPOINTMENT (OUTPATIENT)
Dept: UROLOGY | Facility: CLINIC | Age: 47
End: 2022-01-21

## 2022-01-28 ENCOUNTER — APPOINTMENT (OUTPATIENT)
Dept: PULMONOLOGY | Facility: CLINIC | Age: 47
End: 2022-01-28
Payer: COMMERCIAL

## 2022-01-28 VITALS
HEIGHT: 71 IN | HEART RATE: 79 BPM | WEIGHT: 180.38 LBS | TEMPERATURE: 97.3 F | DIASTOLIC BLOOD PRESSURE: 80 MMHG | SYSTOLIC BLOOD PRESSURE: 142 MMHG | OXYGEN SATURATION: 98 % | BODY MASS INDEX: 25.25 KG/M2

## 2022-01-28 DIAGNOSIS — Z23 ENCOUNTER FOR IMMUNIZATION: ICD-10-CM

## 2022-01-28 PROCEDURE — 99204 OFFICE O/P NEW MOD 45 MIN: CPT | Mod: 25

## 2022-01-28 PROCEDURE — G0008: CPT

## 2022-01-28 PROCEDURE — 71046 X-RAY EXAM CHEST 2 VIEWS: CPT

## 2022-01-28 PROCEDURE — 90686 IIV4 VACC NO PRSV 0.5 ML IM: CPT

## 2022-01-28 NOTE — ASSESSMENT
[FreeTextEntry1] : Data reviewed:\par \par PA/lat CXR in office 1/28/22: clear lungs\par CT abd [facility] reviewed on phone w caveat as to accuracy of reviewing in this way 10/19/21: a few small lucent spaces at bases, no severe emphysema\par \par [place] 4/27/17: normal\par \par Impression:\par Cough after mild Covid\par Some minimal emphysema on CT, never smoker\par \par Plan:\par Can try albuterol for cough, and reassurance given.\par He is a never smoker and this scan does not show severe panlobular emphysema characteristic of A1AT - reassurance for these small incidental findings.\par Flu shot.\par

## 2022-01-28 NOTE — HISTORY OF PRESENT ILLNESS
[TextBox_4] : 4/27/17: Self-referred for "asthma." He feels lightheaded when standing up, walking a few steps, particularly has to stop when walking up a few steps. Doesn't happen when working out - Spinning. When climbing steps he may have to stop and lean on a wall. Colors get bright, heart feels "poundy." Feels dyspneic with this, denies cough or wheeze. Saw Dr Dalal a few years ago, can't remember outcome. Thinks he saw cardiology as well a few years ago but can't remember. No history of lung disease, doesn't smoke. Occ MJ. \par \par 1/28/22: Returns for two reasons. Covid a month ago and still has cough. Had really bad cold symptoms, mostly in head, a little phlegm. Phlegm has gone away, still coughs in the evening, make sure nothing is wrong. Had had two vaccines. And two had CT for kidney stones and this was interpreted as showing mild centrilobular emphysema. Never smoker, no inhaled products. No fam hx of lung or liver disease. Breathing has been ok. Still feels weird on activity - gets lightheaded, dyspnea, has to "ground himself" but still exercises vigorously without limitation. He wore a heart monitor twice but everything was fine.

## 2022-03-01 ENCOUNTER — TRANSCRIPTION ENCOUNTER (OUTPATIENT)
Age: 47
End: 2022-03-01

## 2022-03-02 ENCOUNTER — TRANSCRIPTION ENCOUNTER (OUTPATIENT)
Age: 47
End: 2022-03-02

## 2022-04-11 PROBLEM — Z11.59 SCREENING FOR VIRAL DISEASE: Status: ACTIVE | Noted: 2020-06-23

## 2022-05-16 ENCOUNTER — RX RENEWAL (OUTPATIENT)
Age: 47
End: 2022-05-16

## 2022-05-17 ENCOUNTER — TRANSCRIPTION ENCOUNTER (OUTPATIENT)
Age: 47
End: 2022-05-17

## 2022-08-05 ENCOUNTER — APPOINTMENT (OUTPATIENT)
Dept: HEART AND VASCULAR | Facility: CLINIC | Age: 47
End: 2022-08-05

## 2022-10-10 ENCOUNTER — APPOINTMENT (OUTPATIENT)
Dept: ENDOCRINOLOGY | Facility: CLINIC | Age: 47
End: 2022-10-10

## 2022-10-11 ENCOUNTER — APPOINTMENT (OUTPATIENT)
Dept: ENDOCRINOLOGY | Facility: CLINIC | Age: 47
End: 2022-10-11

## 2022-11-11 ENCOUNTER — TRANSCRIPTION ENCOUNTER (OUTPATIENT)
Age: 47
End: 2022-11-11

## 2023-01-30 ENCOUNTER — APPOINTMENT (OUTPATIENT)
Dept: ENDOCRINOLOGY | Facility: CLINIC | Age: 48
End: 2023-01-30
Payer: COMMERCIAL

## 2023-01-30 VITALS
WEIGHT: 181 LBS | BODY MASS INDEX: 25.24 KG/M2 | HEART RATE: 65 BPM | DIASTOLIC BLOOD PRESSURE: 81 MMHG | SYSTOLIC BLOOD PRESSURE: 131 MMHG

## 2023-01-30 PROCEDURE — 36415 COLL VENOUS BLD VENIPUNCTURE: CPT

## 2023-01-30 PROCEDURE — 99213 OFFICE O/P EST LOW 20 MIN: CPT | Mod: 25

## 2023-01-30 NOTE — PHYSICAL EXAM
[Alert] : alert [Healthy Appearance] : healthy appearance [No Proptosis] : no proptosis [No Lid Lag] : no lid lag [Normal Hearing] : hearing was normal [No LAD] : no lymphadenopathy [Clear to Auscultation] : lungs were clear to auscultation bilaterally [Normal S1, S2] : normal S1 and S2 [Regular Rhythm] : with a regular rhythm [Normal Affect] : the affect was normal [Normal Mood] : the mood was normal [Acanthosis Nigricans] : no acanthosis nigricans [de-identified] : thyroid not palpable

## 2023-01-30 NOTE — HISTORY OF PRESENT ILLNESS
[FreeTextEntry1] : no health issues since last visit \par Has anxiety, palpitations increased this past year\par has fine tremor in hands, not new\par no heat intolerance or increased BMs\par sister and mother were diagnosed with thyroid cancer in the past year\par father had recurrence of prostate cancer\par \par \par meds:\par Unithroid 200mcg daily, skipping one day per month\par Ativan prn, takes 1-2x/month\par has not been taking vitamin D recently

## 2023-01-30 NOTE — DATA REVIEWED
[FreeTextEntry1] : \par 7/21  TSH 0.30, Tg < 0.20, Tg Ab < 20, tot chol 143,  trig 101, HDL 32, LDL 91\par 6/20:TSH 0.45, Tg < 0.20, Tg Ab < 20, 25D 25.8, B12 485\par 3/19: TSH 2.63, tot chol 131, trig 76, HDL 30, LDL 86, A1c 4.7%\par 2/19: TSH 22.80, Tg < 0.20 ,Tg Ab < 20, 25D 17.9\par 3/18: TSH 3.08, tot chol 150, trgi 195, HDL 31, LDL 80, B12 1057, 25D 30.6\par 1/18: 25D 18.5\par 6/17: TSH 1.06, tot chol 162, trig 172, HDL 29, LDL 99\par 4/16: TSH 0.33, free T4 2.2

## 2023-01-30 NOTE — ASSESSMENT
[FreeTextEntry1] : Hypothyroidism, surgical.  Remote history of thyroid cancer, likely cured.\par Check TSH today. Goal 0.5-4.0 range. does not need TSH suppression. \par ok to take two tabs of Unithroid the following day, if he misses a dose (advised not to do this with other meds)\par will do yearly labs, including for PSA\par RTO 1 year

## 2023-01-31 LAB
ALBUMIN SERPL ELPH-MCNC: 5 G/DL
ALP BLD-CCNC: 78 U/L
ALT SERPL-CCNC: 95 U/L
ANION GAP SERPL CALC-SCNC: 16 MMOL/L
AST SERPL-CCNC: 30 U/L
BILIRUB SERPL-MCNC: 0.8 MG/DL
BUN SERPL-MCNC: 20 MG/DL
CALCIUM SERPL-MCNC: 10.7 MG/DL
CHLORIDE SERPL-SCNC: 103 MMOL/L
CO2 SERPL-SCNC: 22 MMOL/L
CREAT SERPL-MCNC: 0.95 MG/DL
EGFR: 99 ML/MIN/1.73M2
GLUCOSE SERPL-MCNC: 89 MG/DL
POTASSIUM SERPL-SCNC: 4.7 MMOL/L
PROT SERPL-MCNC: 7.4 G/DL
PSA SERPL-MCNC: 0.35 NG/ML
SODIUM SERPL-SCNC: 142 MMOL/L
THYROGLOB AB SERPL-ACNC: <20 IU/ML
THYROGLOB SERPL-MCNC: 0.25 NG/ML
TSH SERPL-ACNC: 0.29 UIU/ML
VIT B12 SERPL-MCNC: 407 PG/ML

## 2023-08-30 ENCOUNTER — RX RENEWAL (OUTPATIENT)
Age: 48
End: 2023-08-30

## 2023-11-14 NOTE — HISTORY OF PRESENT ILLNESS
Sent to RN - Please give message below. [FreeTextEntry1] : here for annualfollows w endo and gi and heme\par still occ lightheaded, no syncope\par discussed rfm, cancer screening and vaccines

## 2024-07-01 ENCOUNTER — APPOINTMENT (OUTPATIENT)
Dept: ENDOCRINOLOGY | Facility: CLINIC | Age: 49
End: 2024-07-01
Payer: COMMERCIAL

## 2024-07-01 VITALS
BODY MASS INDEX: 25.06 KG/M2 | HEART RATE: 82 BPM | SYSTOLIC BLOOD PRESSURE: 119 MMHG | WEIGHT: 179 LBS | DIASTOLIC BLOOD PRESSURE: 68 MMHG | HEIGHT: 71 IN

## 2024-07-01 PROCEDURE — 36415 COLL VENOUS BLD VENIPUNCTURE: CPT

## 2024-07-01 PROCEDURE — 99213 OFFICE O/P EST LOW 20 MIN: CPT | Mod: 25

## 2024-07-02 LAB
THYROGLOB AB SERPL-ACNC: <20 IU/ML
THYROGLOB SERPL-MCNC: <0.2 NG/ML
TSH SERPL-ACNC: 0.7 UIU/ML

## 2024-07-23 ENCOUNTER — TRANSCRIPTION ENCOUNTER (OUTPATIENT)
Age: 49
End: 2024-07-23

## 2024-07-25 ENCOUNTER — TRANSCRIPTION ENCOUNTER (OUTPATIENT)
Age: 49
End: 2024-07-25

## 2024-10-03 ENCOUNTER — TRANSCRIPTION ENCOUNTER (OUTPATIENT)
Age: 49
End: 2024-10-03

## 2024-11-01 ENCOUNTER — TRANSCRIPTION ENCOUNTER (OUTPATIENT)
Age: 49
End: 2024-11-01

## 2024-11-15 ENCOUNTER — TRANSCRIPTION ENCOUNTER (OUTPATIENT)
Age: 49
End: 2024-11-15

## 2024-11-18 ENCOUNTER — TRANSCRIPTION ENCOUNTER (OUTPATIENT)
Age: 49
End: 2024-11-18

## 2024-12-28 ENCOUNTER — TRANSCRIPTION ENCOUNTER (OUTPATIENT)
Age: 49
End: 2024-12-28

## 2025-01-21 ENCOUNTER — TRANSCRIPTION ENCOUNTER (OUTPATIENT)
Age: 50
End: 2025-01-21

## 2025-01-23 ENCOUNTER — RX RENEWAL (OUTPATIENT)
Age: 50
End: 2025-01-23

## 2025-07-28 ENCOUNTER — TRANSCRIPTION ENCOUNTER (OUTPATIENT)
Age: 50
End: 2025-07-28

## 2025-09-11 ENCOUNTER — APPOINTMENT (OUTPATIENT)
Dept: ENDOCRINOLOGY | Facility: CLINIC | Age: 50
End: 2025-09-11
Payer: COMMERCIAL

## 2025-09-11 VITALS
DIASTOLIC BLOOD PRESSURE: 88 MMHG | BODY MASS INDEX: 25.11 KG/M2 | HEART RATE: 65 BPM | SYSTOLIC BLOOD PRESSURE: 151 MMHG | WEIGHT: 180 LBS

## 2025-09-11 DIAGNOSIS — E89.0 POSTPROCEDURAL HYPOTHYROIDISM: ICD-10-CM

## 2025-09-11 DIAGNOSIS — Z13.820 ENCOUNTER FOR SCREENING FOR OSTEOPOROSIS: ICD-10-CM

## 2025-09-11 PROCEDURE — 99214 OFFICE O/P EST MOD 30 MIN: CPT | Mod: 25

## 2025-09-11 PROCEDURE — 36415 COLL VENOUS BLD VENIPUNCTURE: CPT

## 2025-09-11 RX ORDER — PYRIDOSTIGMINE BROMIDE 60 MG/1
60 TABLET ORAL
Refills: 0 | Status: ACTIVE | COMMUNITY

## 2025-09-11 RX ORDER — PREDNISONE 10 MG
TABLET ORAL
Refills: 0 | Status: ACTIVE | COMMUNITY

## 2025-09-12 ENCOUNTER — TRANSCRIPTION ENCOUNTER (OUTPATIENT)
Age: 50
End: 2025-09-12

## 2025-09-12 LAB
25(OH)D3 SERPL-MCNC: 25.1 NG/ML
ANION GAP SERPL CALC-SCNC: 15 MMOL/L
BUN SERPL-MCNC: 25 MG/DL
CALCIUM SERPL-MCNC: 9.7 MG/DL
CALCIUM SERPL-MCNC: 9.7 MG/DL
CHLORIDE SERPL-SCNC: 104 MMOL/L
CO2 SERPL-SCNC: 25 MMOL/L
CREAT SERPL-MCNC: 0.91 MG/DL
EGFRCR SERPLBLD CKD-EPI 2021: 102 ML/MIN/1.73M2
GLUCOSE SERPL-MCNC: 79 MG/DL
PARATHYROID HORMONE INTACT: 78 PG/ML
POTASSIUM SERPL-SCNC: 4.3 MMOL/L
SODIUM SERPL-SCNC: 144 MMOL/L
TSH SERPL-ACNC: 1.44 UIU/ML

## 2025-09-15 LAB — ALP BONE SERPL-MCNC: 10.4 UG/L
